# Patient Record
Sex: FEMALE | Race: WHITE | Employment: OTHER | URBAN - METROPOLITAN AREA
[De-identification: names, ages, dates, MRNs, and addresses within clinical notes are randomized per-mention and may not be internally consistent; named-entity substitution may affect disease eponyms.]

---

## 2017-06-02 ENCOUNTER — HOSPITAL ENCOUNTER (OUTPATIENT)
Dept: MAMMOGRAPHY | Age: 67
Discharge: HOME | End: 2017-06-02
Attending: FAMILY MEDICINE
Payer: MEDICARE

## 2017-06-02 DIAGNOSIS — Z78.0 POSTMENOPAUSAL: ICD-10-CM

## 2017-06-02 DIAGNOSIS — Z12.31 VISIT FOR SCREENING MAMMOGRAM: ICD-10-CM

## 2017-06-02 PROCEDURE — 77080 DXA BONE DENSITY AXIAL: CPT

## 2017-06-02 PROCEDURE — 77067 SCR MAMMO BI INCL CAD: CPT

## 2018-08-22 ENCOUNTER — HOSPITAL ENCOUNTER (OUTPATIENT)
Dept: ULTRASOUND IMAGING | Age: 68
Discharge: HOME | End: 2018-08-22
Attending: FAMILY MEDICINE
Payer: MEDICARE

## 2018-08-22 DIAGNOSIS — R74.8 ELEVATED LIVER ENZYMES: ICD-10-CM

## 2018-08-22 PROCEDURE — 76705 ECHO EXAM OF ABDOMEN: CPT

## 2019-06-03 ENCOUNTER — HOSPITAL ENCOUNTER (OUTPATIENT)
Dept: MAMMOGRAPHY | Age: 69
Discharge: HOME | End: 2019-06-03
Attending: FAMILY MEDICINE
Payer: MEDICARE

## 2019-06-03 DIAGNOSIS — M89.9 BONE DISEASE: ICD-10-CM

## 2019-06-03 DIAGNOSIS — Z12.31 VISIT FOR SCREENING MAMMOGRAM: ICD-10-CM

## 2019-06-03 PROCEDURE — 77063 BREAST TOMOSYNTHESIS BI: CPT

## 2019-06-03 PROCEDURE — 77080 DXA BONE DENSITY AXIAL: CPT

## 2020-06-12 ENCOUNTER — HOSPITAL ENCOUNTER (OUTPATIENT)
Dept: MAMMOGRAPHY | Age: 70
Discharge: HOME | End: 2020-06-12
Attending: FAMILY MEDICINE
Payer: MEDICARE

## 2020-06-12 DIAGNOSIS — Z12.31 VISIT FOR SCREENING MAMMOGRAM: ICD-10-CM

## 2020-06-12 PROCEDURE — 77063 BREAST TOMOSYNTHESIS BI: CPT

## 2021-03-08 ENCOUNTER — HOSPITAL ENCOUNTER (OUTPATIENT)
Dept: PHYSICAL THERAPY | Age: 71
Discharge: HOME | End: 2021-03-08
Payer: MEDICARE

## 2021-03-08 PROCEDURE — 97161 PT EVAL LOW COMPLEX 20 MIN: CPT

## 2021-03-08 PROCEDURE — 97110 THERAPEUTIC EXERCISES: CPT

## 2021-03-08 NOTE — THERAPY EVALUATION
Brady Schneider : 1950 [place] Secondary: [address] [place] [address] [facility] [place] [facility], [address], [place] [address] [phone]   [phone] OUTPATIENT PHYSICAL THERAPY:Initial Assessment 3/8/2021 ICD-10: Treatment Diagnosis: Lack of coordination (muscle incoordination) (R27.8), Incomplete uterovaginal prolapse (First degree uterine prolapse, Prolapse of cervix NOS, Second degree uterine prolapse) (N81.2), Overactive bladder (Detrusor muscle hyperactivity) (N32.81), Frequency of micturition (R35.0), Urge incontinence (N39.41) and Generalized weakness (M62.81) Precautions/Allergies:  
Latex, Lipitor [atorvastatin], Neomycin, and Neosporin [hydrocortisone] TREATMENT PLAN: 
Effective Dates: 3/8/2021 TO 2021 (90 days). Frequency/Duration: 1 time a week for 90 Day(s) MEDICAL/REFERRING DIAGNOSIS: 
Incomplete uterovaginal prolapse [N81.2] OAB (overactive bladder) [N32.81] Urge incontinence [N39.41] DATE OF ONSET: chronic REFERRING PHYSICIAN: Hammad Chavarria MD Orders: Evaluate and treat Return MD Appointment: ~2 months INITIAL ASSESSMENT: Brady Schneider presents with musculoskeletal limitations including pelvic floor muscle (PFM) tension, reduced PFM Range of Motion (ROM), reduced coordination and awareness of PFM, reduced hip strength, poor diaphragm excursion, poor abdominal strength and decreased pelvic floor muscle (PFM) strength. These limitations are impairing the patient's ability to properly coordinate perineal elevation and descent for normalized PFM function, contributing to urinary dysfunction and poor pelvic organ support. As a result, she is limited in her/his ability to participate in caring for family member, normal urinary habits and house hold activities/chores. PROBLEM LIST (Impacting functional limitations): 1. Decreased Strength 2.  Decreased ADL/Functional Activities 3. Increased Pain 4. Decreased Activity Tolerance 5. Decreased Flexibility/Joint Mobility 6. Decreased [place] with Home Exercise Program 
7. Decreased timing awareness and coordination 8. Decreased strength of pelvic floor which limits bladder control INTERVENTIONS PLANNED: (Treatment may consist of any combination of the following) 1. Neuromuscular re-education 2. Biofeedback as needed 3. HEP 4. Bladder retraining 5. Bladder education 6. Home Exercise Program (HEP) 7. Manual Therapy 8. Neuromuscular Re-education/Strengthening 9. Range of Motion (ROM) 10. Therapeutic Activites 11. Therapeutic Exercise/Strengthening GOALS: (Goals have been discussed and agreed upon with patient.) Short-Term Functional Goals: Time Frame: 6 weeks 1. Pt will demonstrate I with basic PFM HEP to improve awareness, coordination, and timing of PFM. 2. Pt will demonstrate understanding of and ability to teach back appropriate water and fiber intake, toileting frequency, and positioning for improved self-management of symptoms. 3. Pt will demonstrate ability to isolate a pelvic floor contraction without breath holding and minimal to no accessory muscle use in order to implement the knack and/or urge suppression, reducing pad usage by 50%. 4. Pt will demonstrate ability to perform a coordinate PFM and TA contraction during exhalation for improve coordination with functional tasks such as lifting. Discharge Goals: Time Frame: 12 weeks 1. Pt will demonstrate independence with an advanced HEP for general conditioning, core stabilization, and mobility to facilitate carry over and independent management of symptoms. 2. Pt will be independent in implementation of a timed voiding schedule and use of urge suppression to reduce urinary frequency to 8/day and 1/night.  
3. Pt will be able to verbalize undering and techniques to management prolapse symptoms in order to reduce risk from progressiong of prolapse OUTCOME MEASURE:  
Tool Used: Pelvic Floor Impact Questionnaireshort form 7 (PFIQ-7) Score:  Initial: 3/9/21 · Bladder or Urine: 0/100 · Bowel or Rectum: 0/100 · Vagina or Pelvis: 19/100 Most Recent: X (Date: -- ) · Bladder or Urine: X 
· Bowel or Rectum: X · Vagina or Pelvis: X Interpretation of Score: Each of the 7 sections is scored on a scale from 0-3; 0 representing \"Not at all\", 3 representing \"Quite a bit\". The mean value is taken from all the answered items, then multiplied by 100 to obtain the scale score, ranging from 0-100. This process is repeated for each column representing bowel, bladder, and pelvic pain. MEDICAL NECESSITY:  
· Patient is expected to demonstrate progress in strength, range of motion, coordination and functional technique to improve urinary control and minimize progression of prolapse symptoms. REASON FOR SERVICES/OTHER COMMENTS: 
· Patient continues to require skilled intervention due to above mentioned deficits. Total Duration: PT Patient Time In/Time Out Time In: 1310 Time Out: 1400 Rehabilitation Potential For Stated Goals: Good Regarding Basim Rivera's therapy, I certify that the treatment plan above will be carried out by a therapist or under their direction. Thank you for this referral, Mary Jo Hemphill, PT, DPT Referring Physician Signature: Carlos Esparza,* _______________________________ Date _____________ PAIN/SUBJECTIVE:  
Initial: Pain Intensity 1: 0  Post Session:  0/10 HISTORY:  
History of Injury/Illness (Reason for Referral): Ms. Ania Vaughn is a 71 yo female referred to pelvic floor physical therapy (PFPT) by Carlos Esparza,* 2/2 prolapse and overactive bladder. Pt reports that symptoms noticed a bulge in the vaginal area in January. She was seen by PCP and referred to urogyn. She was fitted with a pessary and has found this to be helpful with vaginal pressure.  She states that she leaves it in for about 10 days and then removes it for a few days when cleaning it. She has had difficult with urgency and urge incontinence for many years. She has been taking detrol or oxybutin for 5+ years but urgency and UUI has worsened over the last 8-9 months. Previous treatment includes medications and pessary. She does have a disabled son and will have to lift him on occasion. He weighs about 130#. She states that she has stopped lifting him recently due to prolapse, but incase of an emergency she would still need to be able to do so. Urinary: Frequency 1 time per hour, volume is minimal. 0-1 x/night. Positive for urge urinary incontinence, urinary urgency, urinary frequency and dysuria. Dysuria is occasional with \"bladder cramps\". Negative for stress urinary incontinence, incomplete emptying, urinary hesitancy/intermittency, hematuria, nocturia and nocturnal enuresis. Pt does uses liners for urinary protection; 2 liners per day (PPD). Fluid intake: 12-16 oz water/day; bladder irritants include: \"large mug\" of coffee, 12oz 7up, lemon water, lemonade. Pt does limit fluid intake due to bladder control. Bowel: Frequency 3-4x/day. Negative for pain with bowel movement, pushing/straining with bowel movement, fecal incontinence, constipation and incomplete emptying. Pt does not use pads for bowel protection; 0 pads per day (PPD). [place] stool type: 5 and 6. Use of stool softeners or laxatives? NO. History of IBS-D; uses immodium as PRN Sexual: Pt is sexually active with male partners. Negative for dyspareunia. Pelvic Organ Prolapse/Pelvic Pain: Location: lower abdomen, L sided \"sciatica\" . Worse with feeling of bladder being full. Relieved with AZO. OB History: Number of pregnancies: 3 Number of vaginal deliveries: 2 Number of c-sections: 0. Patient stated goals for PT: 
Patients goal(s) for PT are to have a stronger pelvic floor and reduce urinary leakage. Past Medical History/Comorbidities: Ms. Cm Roldan has a past medical history of Adverse effect of anesthesia, Allergic rhinitis due to other allergen, Arthritis, Cancer ([address] Chronic diarrhea, Diverticulosis, Esophageal reflux, GERD (gastroesophageal reflux disease), Hypertension, IBS (irritable bowel syndrome), Liver cyst, Menopause, Migraine, unspecified, without mention of intractable migraine without mention of status migrainosus, Mixed hyperlipidemia, Mixed incontinence urge and stress (male)(female), Osteopenia, Perimenopausal, and Prediabetes. She also has no past medical history of Aneurysm ([address] Arrhythmia, Asthma, Autoimmune disease ([address] CAD (coronary artery disease), Chronic kidney disease, Chronic obstructive pulmonary disease ([address] Chronic pain, Coagulation disorder ([address] Diabetes ([address] Difficult intubation, Heart failure ([address] Malignant hyperthermia due to anesthesia, Morbid obesity ([address] Nausea & vomiting, Pseudocholinesterase deficiency, Psychiatric disorder, PUD (peptic ulcer disease), Seizures ([address] Sleep apnea, Stroke ([address] Thromboembolus ([address] or Thyroid disease. Ms. Renita Malone  has a past surgical history that includes hx tubal ligation (1985); hx breast biopsy (Right, 10/02); hx cholecystectomy (2004); hx breast biopsy (Left, 1996); hx colonoscopy (2009, 2017); hx gyn (1983); johnna biopsy breast stereotactic; and hx dilation and curettage (Nov 2015 ). Social History/Living Environment:  
Have you ever had any pelvic trauma (orthopedic in nature, fall, MVA, etc.)? NO Have you ever experienced any unwanted physical or sexual contact? NO Have you ever experienced any form of medical trauma (GYN, urological, GI, etc)? NO Pt lives with her  and disabled son. Alcohol use? Yes; occasional 
Tobacco use? No 
 
Prior Level of Function/Work/Activity: 
Prior level of function: independenet Occupation: Retired Exercise activities: No regular exercise activities Risk Factors: 
     No Risk Factors Identified Ability to Rise from Chair: 
     (0)  Ability to rise in a single movement Falls Prevention Plan: No modifications necessary Total: (5 or greater = High Risk): 0  
©2010 [facility] [place] States Patent #[id number]. Federal Law prohibits the replication, distribution or use without written permission from [facility] [place] Current Medications:   
  
Current Outpatient Medications:  
  hydroCHLOROthiazide (HYDRODIURIL) 12.5 mg tablet, Take 1 Tab by mouth daily. , Disp: 90 Tab, Rfl: 1 
  aspirin delayed-release 81 mg tablet, Take  by mouth daily. , Disp: , Rfl:  
  esomeprazole (NEXIUM) 40 mg capsule, TAKE 1 CAPSULE DAILY, Disp: 90 Cap, Rfl: 3 
  oxybutynin chloride XL (DITROPAN XL) 10 mg CR tablet, Take 1 Tab by mouth daily. , Disp: 90 Tab, Rfl: 1 
  OTHER, IB Guard, Disp: , Rfl:  
  B.infantis-B.ani-B.long-B.bifi 10-15 mg TbEC, Take  by mouth., Disp: , Rfl:  
  CALCIUM CARBONATE (CALCIUM 600 PO), Take 600 mg by mouth daily. , Disp: , Rfl:  
  MULTIVITAMIN PO, Take 1 Tab by mouth daily. , Disp: , Rfl:   
Date Last Reviewed: 3/8/2021 Number of Personal Factors/Comorbidities that affect the Plan of Care: 1-2: MODERATE COMPLEXITY EXAMINATION:  
External Observations:  Voluntary contraction: [] absent     [x] present  Involuntary contraction: [] absent     [x] present  Involuntary relaxation: [] absent     [x] present  Perineal descent at rest: [x] absent     [] present  Perineal descent with bearing: [] absent     [x] present  Skin integrity: WNL Pelvic Floor Muscle (PFM) Assessment:  Vaginal vault size: [] decreased     [] increased     [x] WNL  Muscle volume: [] decreased     [x] WNL     [] Defect  PFM tension: [] decreased     [x] increased     [] WNL Contraction ability: 
Voluntary contraction: [] absent     [x] weak     [] moderate      [] strong Voluntary relaxation: [] absent     [] partial     [x] complete MMT: 1/5 PFM endurance: DNT Repetitions of endurance contractions: DNT Number of quick contractions in 10 seconds: DNT Quality of contractions: fair with significant breath holding and accessory muscle use Tissue laxity test: Anterior wall: [x] minimum     [] moderate     [] severe      [] WNL Posterior wall: [] minimum     [] moderate     [] severe      [] WNL 
FROM PA Note: POP-Q exam was preformed Aa is -2. Ba is -2, point c is +1, gh is 2, pb is 4, tvl is 10, Ap is -2, Bp is -2, and point d is -7. Palpation: via vaginal canal ; pessary was in place during examination and therefore difficult to get a good pictures of Deep PFM; will reassess without pessary at next visit Superficial Pelvic Floor Musculature (PFM): Tender? Intermediate PFM Tender? Deep PFM Tender? Superficial Transverse Perineal [] Right  [] Left  []DNT Deep Transverse Perineal [] Right  [] Left  []DNT Puborectalis [] Right  [] Left  []DNT Ischiocavernosus [] Right  [] Left  []DNT Compressor Urethra [] Right  [] Left  []DNT Pubococcygeus [] Right  [] Left  []DNT Bulbocavernosus [] Right  [] Left  []DNT   Ileococcygeus [] Right  [] Left  []DNT Obturator Internus [] Right  [] Left  []DNT Coccygeus [] Right  [] Left  []DNT Body Structures Involved: 1. Nerves 2. Muscles 3. Ligaments Body Functions Affected: 1. Sensory/Pain 2. Genitourinary 3. Reproductive 4. Neuromusculoskeletal 
5. Movement Related Activities and Participation Affected: 1. Learning and Applying Knowledge 2. General Tasks and Demands 3. Mobility 4. Self Care Number of elements (examined above) that affect the Plan of Care: 3: MODERATE COMPLEXITY CLINICAL PRESENTATION:  
Presentation: Stable and uncomplicated: LOW COMPLEXITY CLINICAL DECISION MAKING:  
Use of outcome tool(s) and clinical judgement create a POC that gives a: Clear prediction of patient's progress: LOW COMPLEXITY

## 2021-03-09 NOTE — PROGRESS NOTES
Dez Speaker  : 1950  [place]  Secondary: [address] [place] [address]  [facility], [address], [place] [address]  [phone]   [phone]      OUTPATIENT PHYSICAL THERAPY: Daily Treatment Note 3/8/2021   Visit Count:  1  ICD-10: Treatment Diagnosis: Lack of coordination (muscle incoordination) (R27.8), Incomplete uterovaginal prolapse (First degree uterine prolapse, Prolapse of cervix NOS, Second degree uterine prolapse) (N81.2), Overactive bladder (Detrusor muscle hyperactivity) (N32.81), Frequency of micturition (R35.0), Urge incontinence (N39.41) and Generalized weakness (M62.81)  Precautions/Allergies:   Latex, Lipitor [atorvastatin], Neomycin, and Neosporin [hydrocortisone]   TREATMENT PLAN:  Effective Dates: 3/8/2021 TO 2021 (90 days). Frequency/Duration: 1 time a week for 90 Day(s) MEDICAL/REFERRING DIAGNOSIS:  Incomplete uterovaginal prolapse [N81.2]  OAB (overactive bladder) [N32.81]  Urge incontinence [N39.41]   DATE OF ONSET: chronic  REFERRING PHYSICIAN: Tiffanie Broussard,*  MD Orders: Evaluate and treat  Return MD Appointment: ~2 months   Pre-treatment Symptoms/Complaints:  UUI and prolapse  Pain: Initial: Pain Intensity 1: 0  Post Session:  0/10   Medications Last Reviewed:  3/8/2021  Updated Objective Findings:  See evaluation note from today   TREATMENT:   THERAPEUTIC EXERCISE: (10 minutes):  Exercises per grid below to improve mobility, strength and coordination. Required moderate verbal and tactile cues to promote proper body breathing techniques and reduce accessory mm use. Progressed range and repetitions as indicated.   TE= therapeutic exercise, TA= therapeutic activity, MT= manual therapy, NE= neuro re-education   Date:  3/8/21 Date:   Date:     Activity/Exercise Parameters Parameters Parameters   PFM coordination Isolation and coordination of kegel with reduction of accessory use Pt gives verbal consent to internal vaginal assessment/treatment without chaperon present. Treatment/Session Summary:    · Response to Treatment:  Pt reports good understanding of plan of care, as well as prescribed home exercise program.  All questions were answered to pt's satisfaction. Pt was invited to call with any further questions or concerns. · Communication/Consultation:  None today  · Equipment provided today:  None today  · Recommendations/Intent for next treatment session: Next visit will focus on reassessment without pessary in place, coordination and strength of PFM.   Total Treatment Billable Duration: Evaluation 40 minutes, treatment 10 minutes  PT Patient Time In/Time Out  Time In: 1310  Time Out: [address] Nix, PT, DPT     Future Appointments   Date Time Provider Uma Elderi   3/17/2021  1:00 PM Dennie Saras, PT, DPT [facility]   3/24/2021  2:00 PM Maggy Rodriguez PT, DPT [facility]   3/31/2021  2:00 PM Maggy Rodriguez PT, DPT [facility]   4/7/2021  1:00 PM Dennie Saras, PT, DPT [facility]   4/14/2021  3:00 PM Dennie Saras, PT, DPT [facility]   4/21/2021  2:00 PM Maggy Rodriguez PT, DPT [facility]

## 2021-03-17 ENCOUNTER — HOSPITAL ENCOUNTER (OUTPATIENT)
Dept: PHYSICAL THERAPY | Age: 71
Discharge: HOME | End: 2021-03-17
Payer: MEDICARE

## 2021-03-17 PROCEDURE — 97110 THERAPEUTIC EXERCISES: CPT

## 2021-03-17 PROCEDURE — 97530 THERAPEUTIC ACTIVITIES: CPT

## 2021-03-17 PROCEDURE — 97140 MANUAL THERAPY 1/> REGIONS: CPT

## 2021-03-17 NOTE — PROGRESS NOTES
Yandy Bass  : 1950  [place]  Secondary: [address] [place] [address]  [facility], [address], [place] [address]  [phone]   [phone]      OUTPATIENT PHYSICAL THERAPY: Daily Treatment Note 3/17/2021   Visit Count:  2  ICD-10: Treatment Diagnosis: Lack of coordination (muscle incoordination) (R27.8), Incomplete uterovaginal prolapse (First degree uterine prolapse, Prolapse of cervix NOS, Second degree uterine prolapse) (N81.2), Overactive bladder (Detrusor muscle hyperactivity) (N32.81), Frequency of micturition (R35.0), Urge incontinence (N39.41) and Generalized weakness (M62.81)  Precautions/Allergies:   Latex, Lipitor [atorvastatin], Neomycin, and Neosporin [hydrocortisone]   TREATMENT PLAN:  Effective Dates: 3/8/2021 TO 2021 (90 days). Frequency/Duration: 1 time a week for 90 Day(s) MEDICAL/REFERRING DIAGNOSIS:  Incomplete uterovaginal prolapse [N81.2]  OAB (overactive bladder) [N32.81]  Urge incontinence [N39.41]   DATE OF ONSET: chronic  REFERRING PHYSICIAN: Javid Mattson,*  MD Orders: Evaluate and treat  Return MD Appointment: ~2 months   Pre-treatment Symptoms/Complaints:  She is doing kegels about 4 times a day, She is doing these mostly in sitting and standing. Unsure if she is doing these correctly, feels movement more in the vagina. Pain: Initial: Pain Intensity 1: 0  Post Session:  0/10   Medications Last Reviewed:  3/17/2021  Updated Objective Findings:  mild tenderness with palpation of superficial PFM, minimal improvements with PIT; delayed, incomplete relaxation   TREATMENT:   THERAPEUTIC EXERCISE: (30 minutes):  Exercises per grid below to improve mobility, strength and coordination. Required moderate visual, verbal and tactile cues to promote proper body breathing techniques and improve PFM relaxation and excursion. Progressed range and repetitions as indicated.   TE= therapeutic exercise, TA= therapeutic activity, MT= manual therapy, NE= neuro re-education   Date:  3/8/21 Date:  3/17/21 Date:     Activity/Exercise Parameters Parameters Parameters   PFM coordination Isolation and coordination of kegel with reduction of accessory use Coordination of kegel with manual palpation; VC to improve coordination of breath w/ engagement; 3H/6R    Diaphragmatic breathing  To improve coordination and excursion of PFM    HEP  3H/6R, 10 reps, 3-4x/day; DB 5 minutes, 2x/day; reduce bladder irritants                              HERAPEUTIC ACTIVITY: ( 15 minutes): Functional activity bladder health education on avoiding bladder irritants to reduce episodes of urge urinary incontinence and urgency/frequency. Patient was provided a list of common bladder irritants including caffeine, carbonation, alcohol, artificial sweeteners, chocolate, acidic drinks, and tomato based drinks. MANUAL THERAPY: (10 minutes): Soft tissue mobilization was utilized and necessary because of the patient's restricted motion of soft tissue. Date Type Location Comments   3/17/2021 Internal assessment/treatment Via vaginal canal Single digit MT to all PFM layers to improve tissue mobility and length; PIT w/ minimal change               (Used abbreviations: MET - muscle energy technique; SCS- Strain counter strain; CTM-Connective tissue mobilizations; CR- Contract/Relax; SP- Sustained pressure; PIT- Positional inhibition techniques; STM Soft -tissue mobilization; MM- Myofascial mobilization; TrP-Trigger point release; IASTM- Instrument assisted soft tissue mobilizations, TDN-Trigger point dry needling)    Pt gives verbal consent to internal vaginal assessment/treatment without chaperon present. Treatment/Session Summary:    · Response to Treatment:  Pt with mild overactive/tension of superficial and deep PFM layers. She demonstrates a delay with relaxation and improve isolation and awareness of PFM engagement. There is noted breath holding present with PFM engagement which improved with verbal cuing. [place] exercises updated today to 3 second hold and 6 second relaxation for emphasis of release of PFM. Diaphragmatic breathing with minimal PFM excursion. We discussed that PFM ROM is just am important as strength, as if tissue does not have good mobility it cannot function optimally. She was educated on bladder health and effects of nervous system on urgency symptoms. Pt verbalize good understanding by end of session and was provided appropriate handouts to improve compliance and performance independently. · Communication/Consultation:  None today  · Equipment provided today:  None today  · Recommendations/Intent for next treatment session: Next visit will focus on reassessment without pessary in place, coordination and strength of PFM.   Total Treatment Billable Duration: 15 minutes TA, 10 minutes MT, 30 minutes TE  PT Patient Time In/Time Out  Time In: 1300  Time Out: Sarita Castillo 1620, PT, DPT     Future Appointments   Date Time Provider Uma Elderi   3/24/2021  2:00 PM Dyana Diaz, PT, DPT [facility]   3/31/2021  2:00 PM Dyana Diaz, PT, DPT [facility]   4/7/2021  1:00 PM Malu Rodriguez PT, DPT [facility]   4/14/2021  3:00 PM Dyana Diaz PT, DPT [facility]   4/21/2021  2:00 PM Malu Rodriguez PT, [facility]PT [facility]

## 2021-03-24 ENCOUNTER — HOSPITAL ENCOUNTER (OUTPATIENT)
Dept: PHYSICAL THERAPY | Age: 71
Discharge: HOME | End: 2021-03-24
Payer: MEDICARE

## 2021-03-24 NOTE — PROGRESS NOTES
Siomara Toscano  : 1950  [place]  Secondary: [address] [place] [address], [address], [address]  [phone]   [phone]        OUTPATIENT DAILY NOTE    NAME/AGE/GENDER: Siomara Toscano is a 72 y.o. female.      DATE: 3/24/2021    Ms. Tyrell Brown CANCELED for today's appointment due to family emergency,  taken to hospital.    Kristel Burden, PT, DPT

## 2021-03-31 ENCOUNTER — HOSPITAL ENCOUNTER (OUTPATIENT)
Dept: PHYSICAL THERAPY | Age: 71
Discharge: HOME | End: 2021-03-31
Payer: MEDICARE

## 2021-03-31 PROCEDURE — 97110 THERAPEUTIC EXERCISES: CPT

## 2021-03-31 PROCEDURE — 97530 THERAPEUTIC ACTIVITIES: CPT

## 2021-03-31 NOTE — PROGRESS NOTES
Pamela Brice  : 1950  [place]  Secondary: [address] [place] [address]  [address] , [address], [place] [address]  [phone]   [phone]      OUTPATIENT PHYSICAL THERAPY: Daily Treatment Note 3/31/2021   Visit Count:  3  ICD-10: Treatment Diagnosis: Lack of coordination (muscle incoordination) (R27.8), Incomplete uterovaginal prolapse (First degree uterine prolapse, Prolapse of cervix NOS, Second degree uterine prolapse) (N81.2), Overactive bladder (Detrusor muscle hyperactivity) (N32.81), Frequency of micturition (R35.0), Urge incontinence (N39.41) and Generalized weakness (M62.81)  Precautions/Allergies:   Latex, Lipitor [atorvastatin], Neomycin, and Neosporin [hydrocortisone]   TREATMENT PLAN:  Effective Dates: 3/8/2021 TO 2021 (90 days). Frequency/Duration: 1 time a week for 90 Day(s) MEDICAL/REFERRING DIAGNOSIS:  Incomplete uterovaginal prolapse [N81.2]  OAB (overactive bladder) [N32.81]  Urge incontinence [N39.41]   DATE OF ONSET: chronic  REFERRING PHYSICIAN: Lisbeth Ortiz,*  MD Orders: Evaluate and treat  Return MD Appointment: ~2 months   Pre-treatment Symptoms/Complaints:  Pt's  had to have a major surgery and she has had to take on a lot more work around the house due to his lifting restrictions. She expresses concern with being able to continue PT at this time due to 's medical conditions, but also states that she is not comfortable with internal treatment. Pain: Initial: Pain Intensity 1: 0  Post Session:  0/10   Medications Last Reviewed:  3/31/2021  Updated Objective Findings:  Not today   TREATMENT:   THERAPEUTIC EXERCISE: (30 minutes):  Exercises per grid below to improve mobility, strength and coordination. Required moderate visual, verbal and tactile cues to promote proper body alignment, promote proper body posture and promote proper body breathing techniques. Progressed resistance, range, repetitions and complexity of movement as indicated. TE= therapeutic exercise, TA= therapeutic activity, MT= manual therapy, NE= neuro re-education   Date:  3/8/21 Date:  3/17/21 Date:  3/31/21   Activity/Exercise Parameters Parameters Parameters   PFM coordination Isolation and coordination of kegel with reduction of accessory use Coordination of kegel with manual palpation; VC to improve coordination of breath w/ engagement; 3H/6R    Diaphragmatic breathing  To improve coordination and excursion of PFM    HEP  3H/6R, 10 reps, 3-4x/day; DB 5 minutes, 2x/day; reduce bladder irritants    Hip adduction   3x10   Hip abduction   3x10   Bridge   3x10   Sit to stand   3x10     HERAPEUTIC ACTIVITY: (25 minutes): Functional activity training on role and use of urge suppression in order to delay voiding, minimize urge urinary incontinence and improve control in the presence of urge triggers (ie. running water, key in lock, cold, etc.) and Extensive functional activity training on avoiding valsava/breath holding during strenuous activities as well as abdominal/pelvic bracing to provide muscular support and decrease symptoms of POP during lifting, sit to stand, and other household related tasks. Treatment/Session Summary:    · Response to Treatment:  Pt expressed today that she is not comfortable with internal treatment and is concerned about time due to having more responsibilities around the home after her husbands surgery. We discussed that internal treatment is not required if she is not comfortable with it. She would like to complete remaining appointments. She was progressed with exercise activities today with focus on coordination of breathing with use of kegel during movement. She has improved coordination of breathing with movement based activities. She does note mild breath holding with sit to stand and lifting activities.  We discussed coordination of breathing and PFM during activities throughout the day, especially heavier lifting, is very important to minimize pressure and strain on pelvic organs and PFM. · Communication/Consultation:  None today  · Equipment provided today:  None today  · Recommendations/Intent for next treatment session: Next visit will focus on progress of exercise activities, lifting.   Total Treatment Billable Duration: 25 minutes TA, 30 minutes TE  PT Patient Time In/Time Out  Time In: 1400  Time Out: 200 Dolores Lindsey, PT, DPT     Future Appointments   Date Time Provider Uma Graves   4/7/2021  1:00 PM Mark Delgado PT, DPT [facility]   4/14/2021  7:00 PM Mark Delgado PT, DPT [facility]   4/21/2021  2:00 PM Virgie Rodriguez, PT, DPT [facility]

## 2021-04-07 ENCOUNTER — HOSPITAL ENCOUNTER (OUTPATIENT)
Dept: PHYSICAL THERAPY | Age: 71
Discharge: HOME | End: 2021-04-07
Payer: MEDICARE

## 2021-04-07 PROCEDURE — 97110 THERAPEUTIC EXERCISES: CPT

## 2021-04-07 NOTE — PROGRESS NOTES
Lizz Clayton  : 1950  [place]  Secondary: [facility]  [address] , [address], [place] [address]  [phone]   [phone]      OUTPATIENT PHYSICAL THERAPY: Daily Treatment Note 2021   Visit Count:  4  ICD-10: Treatment Diagnosis: Lack of coordination (muscle incoordination) (R27.8), Incomplete uterovaginal prolapse (First degree uterine prolapse, Prolapse of cervix NOS, Second degree uterine prolapse) (N81.2), Overactive bladder (Detrusor muscle hyperactivity) (N32.81), Frequency of micturition (R35.0), Urge incontinence (N39.41) and Generalized weakness (M62.81)  Precautions/Allergies:   Latex, Lipitor [atorvastatin], Neomycin, and Neosporin [hydrocortisone]   TREATMENT PLAN:  Effective Dates: 3/8/2021 TO 2021 (90 days). Frequency/Duration: 1 time a week for 90 Day(s) MEDICAL/REFERRING DIAGNOSIS:  Incomplete uterovaginal prolapse [N81.2]  OAB (overactive bladder) [N32.81]  Urge incontinence [N39.41]   DATE OF ONSET: chronic  REFERRING PHYSICIAN: Mary Chavez,*  MD Orders: Evaluate and treat  Return MD Appointment: ~2 months   Pre-treatment Symptoms/Complaints:  States that she has been doing mat exercises less frequently this week, but has been pretty good about doing kegels. She is using lifting mechanics. Leakage has been \"somewhat better\", but states that she did have a UTI that showed up on work up this week. She is using urge suppression with some success. Pain: Initial: Pain Intensity 1: 0  Post Session:  0/10   Medications Last Reviewed:  2021  Updated Objective Findings:  Not today   TREATMENT:   THERAPEUTIC EXERCISE: (45 minutes):  Exercises per grid below to improve mobility, strength and coordination. Required moderate visual, verbal and tactile cues to promote proper body alignment, promote proper body posture and promote proper body breathing techniques. Progressed resistance, range, repetitions and complexity of movement as indicated. TE= therapeutic exercise, TA= therapeutic activity, MT= manual therapy, NE= neuro re-education   Date:  3/8/21 Date:  3/17/21 Date:  3/31/21 Date:  4/7/21   Activity/Exercise Parameters Parameters Parameters    PFM coordination Isolation and coordination of kegel with reduction of accessory use Coordination of kegel with manual palpation; VC to improve coordination of breath w/ engagement; 3H/6R     Diaphragmatic breathing  To improve coordination and excursion of PFM     HEP  3H/6R, 10 reps, 3-4x/day; DB 5 minutes, 2x/day; reduce bladder irritants     Hip adduction   3x10 x15 w/ pillow   Hip abduction   3x10 x15, lime TB   Bridge   3x10 x15   Sit to stand   3x10    TA Bracing    10 minutes   Supine marching    3x12   Clamshell    x15 each side     THERAPEUTIC ACTIVITY: (0 minutes): Functional activity training on role and use of urge suppression in order to delay voiding, minimize urge urinary incontinence and improve control in the presence of urge triggers (ie. running water, key in lock, cold, etc.) and Extensive functional activity training on avoiding valsava/breath holding during strenuous activities as well as abdominal/pelvic bracing to provide muscular support and decrease symptoms of POP during lifting, sit to stand, and other household related tasks. Treatment/Session Summary:    · Response to Treatment: Pt challenged with TA engagement and coordination today, improved with practice and use of breathing for activation. She demonstrates good awareness with identifying deviation from appropriate breathing, however require verbal cuing to improve coordination with new movements. She is implementing concepts and verbalize good understanding of proper use of PFM and exhale with heavier lifting activities.    · Communication/Consultation:  None today  · Equipment provided today:  None today  · Recommendations/Intent for next treatment session: Next visit will focus on progress of exercise activities, lifting.   Total Treatment Billable Duration: 45 minutes TE  PT Patient Time In/Time Out  Time In: 7480  Time Out: Sarita Castillo 1620, PT, DPT     Future Appointments   Date Time Provider Uma Graves   4/14/2021  7:00 PM Marcelino Cabrera, PT, DPT [facility]   4/21/2021  2:00 PM Elena Rodriguez, PT, DPT [facility]

## 2021-04-12 ENCOUNTER — TRANSCRIBE ORDER (OUTPATIENT)
Dept: SCHEDULING | Age: 71
End: 2021-04-12

## 2021-04-12 DIAGNOSIS — Z78.0 ASYMPTOMATIC POSTMENOPAUSAL STATE: ICD-10-CM

## 2021-04-12 DIAGNOSIS — Z12.31 ENCOUNTER FOR SCREENING MAMMOGRAM FOR MALIGNANT NEOPLASM OF BREAST: Primary | ICD-10-CM

## 2021-04-14 ENCOUNTER — HOSPITAL ENCOUNTER (OUTPATIENT)
Dept: PHYSICAL THERAPY | Age: 71
Discharge: HOME | End: 2021-04-14
Payer: MEDICARE

## 2021-04-14 NOTE — PROGRESS NOTES
Flavia Qiu  : 1950  [place]  Secondary: [address] [place] [address], [address], [place] [address]  [phone]   [phone]      OUTPATIENT DAILY NOTE    NAME/AGE/GENDER: Flavia Qiu is a 72 y.o. female. DATE: 2021    Ms. Harleen Drake CANCELED for today's appointment due to appointment conflict (>22 hours).     Gladis Henderson, PT, DPT

## 2021-04-21 ENCOUNTER — HOSPITAL ENCOUNTER (OUTPATIENT)
Dept: PHYSICAL THERAPY | Age: 71
Discharge: HOME | End: 2021-04-21
Payer: MEDICARE

## 2021-04-21 PROCEDURE — 97530 THERAPEUTIC ACTIVITIES: CPT

## 2021-04-21 PROCEDURE — 97110 THERAPEUTIC EXERCISES: CPT

## 2021-04-21 NOTE — THERAPY DISCHARGE
Cara Ruiz : 1950 [place] Secondary: [address] [place] [address] [address], [address], [place] [address] [phone]   [phone] OUTPATIENT PHYSICAL THERAPY:Discharge Summary 2021 ICD-10: Treatment Diagnosis: Lack of coordination (muscle incoordination) (R27.8), Incomplete uterovaginal prolapse (First degree uterine prolapse, Prolapse of cervix NOS, Second degree uterine prolapse) (N81.2), Overactive bladder (Detrusor muscle hyperactivity) (N32.81), Frequency of micturition (R35.0), Urge incontinence (N39.41) and Generalized weakness (M62.81) Precautions/Allergies:  
Latex, Lipitor [atorvastatin], Neomycin, and Neosporin [hydrocortisone] TREATMENT PLAN: 
Effective Dates: 3/8/2021 TO 2021 (90 days). Frequency/Duration: 1 time a week for 90 Day(s) MEDICAL/REFERRING DIAGNOSIS: 
Incomplete uterovaginal prolapse [N81.2] OAB (overactive bladder) [N32.81] Urge incontinence [N39.41] DATE OF ONSET: chronic REFERRING PHYSICIAN: May aPz MD Orders: Evaluate and treat Return MD Appointment: beginning of May DISCHARGE SUMMARY: Ms. Guera Miller has been seen in skilled PT from 3/8/21 to 21. Patient has attended 5 out of 7 scheduled visits, with 2 cancellation(s) and 0 no shows. Treatment has emphasized PFM retraining, coordination,  with focus on coordination of kegel and breathing with movement in order to minimize progression of POP symptoms, as well as bladder health education and retraining. PT focusing on exercise based treatment as patient requested; decline internal treatment due to comfort level. Patient responded well to therapy, with improvements in urinary frequency, body mechanics and awareness of breath with movement in order to reduce breath holding with lifting and functionalmovements. . Pt has achieved goals as indicated below and all questions have been answered to their satisfaction. Pt was invited to call with any further questions or concerns as needed. INITIAL ASSESSMENT: Tristan Wetzel presents with musculoskeletal limitations including pelvic floor muscle (PFM) tension, reduced PFM Range of Motion (ROM), reduced coordination and awareness of PFM, reduced hip strength, poor diaphragm excursion, poor abdominal strength and decreased pelvic floor muscle (PFM) strength. These limitations are impairing the patient's ability to properly coordinate perineal elevation and descent for normalized PFM function, contributing to urinary dysfunction and poor pelvic organ support. As a result, she is limited in her/his ability to participate in caring for family member, normal urinary habits and house hold activities/chores. GOALS: (Goals have been discussed and agreed upon with patient.) Short-Term Functional Goals: Time Frame: 6 weeks 1. Pt will demonstrate I with basic PFM HEP to improve awareness, coordination, and timing of PFM. (MET 4/21/21) 2. Pt will demonstrate understanding of and ability to teach back appropriate water and fiber intake, toileting frequency, and positioning for improved self-management of symptoms. (MET 4/21/21) 3. Pt will demonstrate ability to isolate a pelvic floor contraction without breath holding and minimal to no accessory muscle use in order to implement the knack and/or urge suppression, reducing pad usage by 50%. (PARTIALLY MET 4/21/21) 4. Pt will demonstrate ability to perform a coordinate PFM and TA contraction during exhalation for improve coordination with functional tasks such as lifting. (MET 4/21/21) Discharge Goals: Time Frame: 12 weeks 1. Pt will demonstrate independence with an advanced HEP for general conditioning, core stabilization, and mobility to facilitate carry over and independent management of symptoms. (PARTIALY MET 4/21/21) 2.  Pt will be independent in implementation of a timed voiding schedule and use of urge suppression to reduce urinary frequency to 8/day and 1/night. (MET 4/21/21) 3. Pt will be able to verbalize understanding and techniques to management prolapse symptoms in order to reduce risk from progressiong of prolapse. (4/21/21) 
 
4/21/21 update: Pt is doing well. She reports 30-40% improvements since starting PT. She reports inconsistent practice of HEP due to caring for other family members, but has been doing some stretches for sciatics and trying to use kegels then. She would like to continue with HEP independently at this time due to other priorities and having to care for other people in her family. 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 Urinary: Frequency 4-5x/day 0-1 x/night. Negative for stress urinary incontinence, urge urinary incontinence, urinary urgency, urinary frequency, incomplete emptying, urinary hesitancy/intermittency, dysuria, hematuria, nocturia and nocturnal enuresis. Pt does uses liners for urinary protection; 1 liners per day (PPD). Fluid intake: 2-3 x 16oz water/day; bladder irritants include: half and half coffee in AM. Pt does limit fluid intake due to bladder control. Bowel: Frequency 3-4x/day. Negative for pain with bowel movement, pushing/straining with bowel movement, fecal incontinence, constipation and incomplete emptying. Pt does not use pads for bowel protection; 0 pads per day (PPD). [place] stool type: 5 and 6. Use of stool softeners or laxatives? NO. History of IBS-D; uses immodium as PRN Sexual: Pt is sexually active with male partners. Negative for dyspareunia. Pelvic Organ Prolapse/Pelvic Pain: Location: L sided \"sciatica\"- doing stretches to help with OUTCOME MEASURE:  
Tool Used: Pelvic Floor Impact Questionnaireshort form 7 (PFIQ-7) Score:  Initial: 3/9/21 · Bladder or Urine: 0/100 · Bowel or Rectum: 0/100 · Vagina or Pelvis: 19/100 Most Recent: 4/21/21 · Bladder or Urine: 0 
· Bowel or Rectum: 0 
· Vagina or Pelvis: 0 Interpretation of Score: Each of the 7 sections is scored on a scale from 0-3; 0 representing \"Not at all\", 3 representing \"Quite a bit\". The mean value is taken from all the answered items, then multiplied by 100 to obtain the scale score, ranging from 0-100. This process is repeated for each column representing bowel, bladder, and pelvic pain. UPDATED OBJECTIVE FINDINGS:   
Coordination of breathing and pt reports being able to maintain PFM contraction during movement. * PT deferred internal assessment of PFM due to patient comfort Total Duration: PT Patient Time In/Time Out Time In: 1400 Time Out: 1500 Rehabilitation Potential For Stated Goals: Good Regarding Ty Rivera's therapy, I certify that the treatment plan above will be carried out by a therapist or under their direction. Thank you for this referral, Nadir Christensen, PT, DPT

## 2021-04-21 NOTE — PROGRESS NOTES
Katherin Hu  : 1950  [place]  Secondary: [address] [place] [address]  [address] , [address], [place] [address]  [phone]   [phone]      OUTPATIENT PHYSICAL THERAPY: Daily Treatment Note 2021   Visit Count:  5  ICD-10: Treatment Diagnosis: Lack of coordination (muscle incoordination) (R27.8), Incomplete uterovaginal prolapse (First degree uterine prolapse, Prolapse of cervix NOS, Second degree uterine prolapse) (N81.2), Overactive bladder (Detrusor muscle hyperactivity) (N32.81), Frequency of micturition (R35.0), Urge incontinence (N39.41) and Generalized weakness (M62.81)  Precautions/Allergies:   Latex, Lipitor [atorvastatin], Neomycin, and Neosporin [hydrocortisone]   TREATMENT PLAN:  Effective Dates: 3/8/2021 TO 2021 (90 days). Frequency/Duration: 1 time a week for 90 Day(s) MEDICAL/REFERRING DIAGNOSIS:  Incomplete uterovaginal prolapse [N81.2]  OAB (overactive bladder) [N32.81]  Urge incontinence [N39.41]   DATE OF ONSET: chronic  REFERRING PHYSICIAN: Tomer Mckeon,*  MD Orders: Evaluate and treat  Return MD Appointment: ~2 months   Pre-treatment Symptoms/Complaints:  Pt reports 30-40% improvement since starting PT. She would like to keep today her last visit today due to other priorities at home. She reports decreased urinary frequency to 4-5x/day, 1x/night. She is using the pessary and it is helpful, cleaning about every 10 days. Pain: Initial: Pain Intensity 1: 0  Post Session:  0/10   Medications Last Reviewed:  2021  Updated Objective Findings:  See discharge assessment   TREATMENT:   THERAPEUTIC EXERCISE: (45 minutes):  Exercises per grid below to improve mobility, strength and coordination. Required moderate visual, verbal and tactile cues to promote proper body alignment, promote proper body posture and promote proper body breathing techniques.   Progressed resistance, range, repetitions and complexity of movement as indicated. TE= therapeutic exercise, TA= therapeutic activity, MT= manual therapy, NE= neuro re-education   Date:  3/8/21 Date:  3/17/21 Date:  3/31/21 Date:  4/7/21 Date:  4/21/21   Activity/Exercise Parameters Parameters Parameters     PFM coordination Isolation and coordination of kegel with reduction of accessory use Coordination of kegel with manual palpation; VC to improve coordination of breath w/ engagement; 3H/6R      Diaphragmatic breathing  To improve coordination and excursion of PFM      HEP  3H/6R, 10 reps, 3-4x/day; DB 5 minutes, 2x/day; reduce bladder irritants      Hip adduction   3x10 x15 w/ pillow x20 w/ ball   Hip abduction   3x10 x15, lime TB X20, lime TB   Bridge   3x10 x15 x10  x10 w/ hip abd, yellow   Sit to stand   3x10     TA Bracing    10 minutes    Supine marching    3x12    Clamshell    x15 each side    Reverse clam     x15   Piriformis stretch     3x30s   Wide leg lumbar rotation for hip IR     x30   Supine sciatic nerve glide     x10   Hamstring stretch w/ strap     3x30s     THERAPEUTIC ACTIVITY: ( 10 minutes): Extensive functional activity training on avoiding valsava/breath holding during strenuous activities as well as abdominal/pelvic bracing to provide muscular support and decrease symptoms of POP during lifting, sit to stand, getting in/out of car as well as log roll technique to decrease intraabdominal pressure. 12# lift from ground, review of mechanics in order to minimize pelvic pressure and POP. Treatment/Session Summary:    · Response to Treatment: Ms. Elvia Schultz has been seen in skilled PT from 3/8/21 to 4/21/21. Patient has attended 5 out of 7 scheduled visits, with 2 cancellation(s) and 0 no shows.  Treatment has emphasized PFM retraining, coordination,  with focus on coordination of kegel and breathing with movement in order to minimize progression of POP symptoms, as well as bladder health education and retraining. PT focusing on exercise based treatment as patient requested; decline internal treatment due to comfort level. Patient responded well to therapy, with improvements in urinary frequency, body mechanics and awareness of breath with movement in order to reduce breath holding with lifting and functionalmovements. . Pt has achieved goals as indicated below and all questions have been answered to their satisfaction. Pt was invited to call with any further questions or concerns as needed. · Communication/Consultation:  None today  · Equipment provided today:  None today  Total Treatment Billable Duration: 45 minutes TE, 10 minutes TA  PT Patient Time In/Time Out  Time In: 1400  Time Out: [address], PT, DPT     No future appointments.

## 2021-06-15 ENCOUNTER — HOSPITAL ENCOUNTER (OUTPATIENT)
Dept: MAMMOGRAPHY | Age: 71
Discharge: HOME | End: 2021-06-15
Attending: FAMILY MEDICINE
Payer: MEDICARE

## 2021-06-15 DIAGNOSIS — Z78.0 ASYMPTOMATIC POSTMENOPAUSAL STATE: ICD-10-CM

## 2021-06-15 DIAGNOSIS — Z12.31 ENCOUNTER FOR SCREENING MAMMOGRAM FOR MALIGNANT NEOPLASM OF BREAST: ICD-10-CM

## 2021-06-15 PROCEDURE — 77067 SCR MAMMO BI INCL CAD: CPT

## 2021-06-15 PROCEDURE — 77080 DXA BONE DENSITY AXIAL: CPT

## 2022-04-05 ENCOUNTER — TRANSCRIBE ORDER (OUTPATIENT)
Dept: SCHEDULING | Age: 72
End: 2022-04-05

## 2022-04-05 DIAGNOSIS — Z12.31 ENCOUNTER FOR SCREENING MAMMOGRAM FOR MALIGNANT NEOPLASM OF BREAST: ICD-10-CM

## 2022-04-05 DIAGNOSIS — Z78.0 ASYMPTOMATIC MENOPAUSAL STATE: Primary | ICD-10-CM

## 2022-06-20 ENCOUNTER — HOSPITAL ENCOUNTER (OUTPATIENT)
Dept: MAMMOGRAPHY | Age: 72
Discharge: HOME | End: 2022-06-23
Payer: MEDICARE

## 2022-06-20 DIAGNOSIS — Z12.31 ENCOUNTER FOR SCREENING MAMMOGRAM FOR MALIGNANT NEOPLASM OF BREAST: ICD-10-CM

## 2022-06-20 PROCEDURE — 77067 SCR MAMMO BI INCL CAD: CPT

## 2023-06-21 ENCOUNTER — HOSPITAL ENCOUNTER (OUTPATIENT)
Dept: MAMMOGRAPHY | Age: 73
Discharge: HOME | End: 2023-06-24
Payer: MEDICARE

## 2023-06-21 DIAGNOSIS — Z78.0 ASYMPTOMATIC MENOPAUSAL STATE: ICD-10-CM

## 2023-06-21 DIAGNOSIS — Z12.31 ENCOUNTER FOR SCREENING MAMMOGRAM FOR MALIGNANT NEOPLASM OF BREAST: ICD-10-CM

## 2023-06-21 PROCEDURE — 77080 DXA BONE DENSITY AXIAL: CPT

## 2023-06-21 PROCEDURE — 77063 BREAST TOMOSYNTHESIS BI: CPT

## 2023-07-10 ENCOUNTER — HOSPITAL ENCOUNTER (OUTPATIENT)
Dept: MAMMOGRAPHY | Age: 73
Discharge: HOME | End: 2023-07-13
Attending: FAMILY MEDICINE
Payer: MEDICARE

## 2023-07-10 DIAGNOSIS — R92.8 ABNORMAL MAMMOGRAM: ICD-10-CM

## 2023-07-10 PROCEDURE — 77065 DX MAMMO INCL CAD UNI: CPT

## 2023-08-02 ENCOUNTER — HOSPITAL ENCOUNTER (OUTPATIENT)
Dept: MAMMOGRAPHY | Age: 73
Discharge: HOME | End: 2023-08-05
Attending: FAMILY MEDICINE
Payer: MEDICARE

## 2023-08-02 DIAGNOSIS — N63.20 MASS OF LEFT BREAST, UNSPECIFIED QUADRANT: ICD-10-CM

## 2023-08-02 PROCEDURE — 76642 ULTRASOUND BREAST LIMITED: CPT

## 2024-07-24 ENCOUNTER — HOSPITAL ENCOUNTER (OUTPATIENT)
Dept: MAMMOGRAPHY | Age: 74
Discharge: HOME | End: 2024-07-27
Payer: MEDICARE

## 2024-07-24 VITALS — HEIGHT: 60 IN | WEIGHT: 131 LBS | BODY MASS INDEX: 25.72 KG/M2

## 2024-07-24 DIAGNOSIS — Z12.31 SCREENING MAMMOGRAM FOR BREAST CANCER: ICD-10-CM

## 2024-07-24 PROCEDURE — 77063 BREAST TOMOSYNTHESIS BI: CPT

## 2024-08-07 ENCOUNTER — HOSPITAL ENCOUNTER (OUTPATIENT)
Dept: MAMMOGRAPHY | Age: 74
Discharge: HOME | End: 2024-08-10
Attending: FAMILY MEDICINE
Payer: MEDICARE

## 2024-08-07 DIAGNOSIS — R92.8 ABNORMAL SCREENING MAMMOGRAM: ICD-10-CM

## 2024-08-07 PROCEDURE — G0279 TOMOSYNTHESIS, MAMMO: HCPCS

## 2025-09-05 ENCOUNTER — TRANSCRIBE ORDERS (OUTPATIENT)
Dept: SCHEDULING | Age: 75
End: 2025-09-05

## 2025-09-05 DIAGNOSIS — Z78.0 POSTMENOPAUSAL STATUS (AGE-RELATED) (NATURAL): Primary | ICD-10-CM

## 2025-09-05 DIAGNOSIS — Z12.31 ENCOUNTER FOR SCREENING MAMMOGRAM FOR MALIGNANT NEOPLASM OF BREAST: Primary | ICD-10-CM
